# Patient Record
(demographics unavailable — no encounter records)

---

## 2025-05-12 NOTE — ASSESSMENT
[FreeTextEntry1] : Presents for atraumatic 3-month history of right shoulder pain.  This further complicated by prior cervical fusion with trapezial and paracervical irritation.  Seems to predominantly a shoulder issue.  She was told over 20 years ago that she had a partial rotator cuff tear which was treated nonoperatively.  She is a bit weaker with rotator cuff testing today suggesting tear progression.  Her shoulder x-rays are unremarkable.  Advised updated MRI to evaluate rotator cuff pathology.  Medrol Dosepak prescription sent to the pharmacy in the interim.  Side effects reviewed.  Discussed corticosteroid injection next visit if still symptomatic.  Prognosis uncertain without the MRI.  We also discussed surgical repair for refractory cases.  The patient's current medication management of their orthopedic diagnosis was reviewed today: There is a moderate risk of morbidity with further treatment, especially from use of prescription strength medications and possible side effects of these medications which include upset stomach with oral medications, skin reactions to topical medications and cardiac/renal/diabetes issues with long term use.

## 2025-05-12 NOTE — IMAGING
[de-identified] : Shoulder Exam Inspection: No swelling, no ecchymosis, no madison deformity Palpation: Tenderness to palpation over greater tuberosity and posterior shoulder Stability: No instability or tenderness over AC joint Range of Motion:  Passive FF: 160; ER: 80: IR: 70; ER at the side 50; rom guarded by pain Strength: 4-/5 supraspinatus, infraspinatus and 5-/5 subscapularis; there is pain with strength testing Special testing: Positive Arzate test, positive impingement sign; Speeds and yergason negative Neuro: Motor and sensory intact distally  Cervical Spine:  No swelling, no ecchymosis Trapezial and paracervical tenderness to palpation Diminished range of motion in all planes 5/5 deltoid, biceps, triceps and wrist flexors/extensors Negative spurling, negative gottlieb Reflexes +2, intact motor distally NVID [Right] : right shoulder [Degenerative change] : Degenerative change [Type 2 acromion] : Type 2 acromion

## 2025-05-12 NOTE — HISTORY OF PRESENT ILLNESS
[5] : 5 [Intermittent] : intermittent [Sleep] : sleep [de-identified] : 5.12.25 Patient here for right shoulder hussein. Patient states pain started 3 months ago, no injury. Her right hand is her dominant hand.

## 2025-05-19 NOTE — HISTORY OF PRESENT ILLNESS
[5] : 5 [Intermittent] : intermittent [Sleep] : sleep [de-identified] : 5.12.25 Patient here for right shoulder hussein. Patient states pain started 3 months ago, no injury. Her right hand is her dominant hand.  05/19/2025: pt is here for right shoulder MRI results. states relief w/ MDP while taking it. pain returned after prescription ended.

## 2025-05-19 NOTE — ASSESSMENT
[FreeTextEntry1] : I personally reviewed and interpreted the MRI images in detail.  Partial tearing of the rotator cuff including subscapularis with no large retracted tear noted.  There is also degenerative changes in the glenohumeral joint suggestive of early arthritis.  She feels significantly better after the Medrol Dosepak.  Home exercise program is advised.  Proper lifting reviewed.  I have started her on meloxicam 15 mg prescription for maintenance therapy as needed.  She has been on Celebrex in the past with good relief.  Discussed corticosteroid injection down the line if needed.  Eventually may benefit from arthroscopy but reasonable to monitor for now.  The patient's current medication management of their orthopedic diagnosis was reviewed today: There is a moderate risk of morbidity with further treatment, especially from use of prescription strength medications and possible side effects of these medications which include upset stomach with oral medications, skin reactions to topical medications and cardiac/renal/diabetes issues with long term use.

## 2025-05-19 NOTE — IMAGING
[de-identified] : Shoulder Exam Inspection: No swelling, no ecchymosis, no madison deformity Passive FF: 180; ER: 90: IR: 70; ER at the side 50 Strength: 5-/5 supraspinatus, infraspinatus and subscapularis; there is pain with strength testing